# Patient Record
Sex: FEMALE | Race: WHITE | NOT HISPANIC OR LATINO | URBAN - METROPOLITAN AREA
[De-identification: names, ages, dates, MRNs, and addresses within clinical notes are randomized per-mention and may not be internally consistent; named-entity substitution may affect disease eponyms.]

---

## 2017-01-10 ENCOUNTER — IMPORTED ENCOUNTER (OUTPATIENT)
Dept: URBAN - METROPOLITAN AREA CLINIC 50 | Facility: CLINIC | Age: 42
End: 2017-01-10

## 2017-01-11 ENCOUNTER — IMPORTED ENCOUNTER (OUTPATIENT)
Dept: URBAN - METROPOLITAN AREA CLINIC 50 | Facility: CLINIC | Age: 42
End: 2017-01-11

## 2018-05-08 ENCOUNTER — IMPORTED ENCOUNTER (OUTPATIENT)
Dept: URBAN - METROPOLITAN AREA CLINIC 50 | Facility: CLINIC | Age: 43
End: 2018-05-08

## 2019-04-23 ENCOUNTER — IMPORTED ENCOUNTER (OUTPATIENT)
Dept: URBAN - METROPOLITAN AREA CLINIC 50 | Facility: CLINIC | Age: 44
End: 2019-04-23

## 2019-05-01 ENCOUNTER — IMPORTED ENCOUNTER (OUTPATIENT)
Dept: URBAN - METROPOLITAN AREA CLINIC 50 | Facility: CLINIC | Age: 44
End: 2019-05-01

## 2019-05-01 NOTE — PATIENT DISCUSSION
"""Discussed mono VA vs. DVA only w/ OTC readers in the future when she begins to need near correction. Patient given final contact lens prescription.  5/1/2019"""

## 2020-04-28 ENCOUNTER — IMPORTED ENCOUNTER (OUTPATIENT)
Dept: URBAN - METROPOLITAN AREA CLINIC 50 | Facility: CLINIC | Age: 45
End: 2020-04-28

## 2020-06-08 ENCOUNTER — IMPORTED ENCOUNTER (OUTPATIENT)
Dept: URBAN - METROPOLITAN AREA CLINIC 50 | Facility: CLINIC | Age: 45
End: 2020-06-08

## 2020-08-11 ENCOUNTER — IMPORTED ENCOUNTER (OUTPATIENT)
Dept: URBAN - METROPOLITAN AREA CLINIC 50 | Facility: CLINIC | Age: 45
End: 2020-08-11

## 2021-04-18 ASSESSMENT — VISUAL ACUITY
OD_CC: 20/20
OS_CC: 20/20
OD_CC: 20/20
OD_CC: 20/20-1
OD_CC: 20/20
OD_CC: J1+/-1
OS_CC: J1+/-1
OS_CC: 20/20
OS_CC: J1
OD_CC: 20/25
OD_CC: J1
OS_CC: J1+@ 16 IN
OD_CC: J1+@ 16 IN
OD_CC: J1+(-1)
OS_CC: J1+(-1)

## 2021-04-18 ASSESSMENT — TONOMETRY
OD_IOP_MMHG: 9
OD_IOP_MMHG: 10
OS_IOP_MMHG: 8
OS_IOP_MMHG: 15
OD_IOP_MMHG: 8
OD_IOP_MMHG: 15
OS_IOP_MMHG: 10
OS_IOP_MMHG: 10

## 2021-04-27 ENCOUNTER — COMPREHENSIVE EXAM (OUTPATIENT)
Dept: URBAN - METROPOLITAN AREA CLINIC 50 | Facility: CLINIC | Age: 46
End: 2021-04-27

## 2021-04-27 DIAGNOSIS — H52.13: ICD-10-CM

## 2021-04-27 DIAGNOSIS — H40.013: ICD-10-CM

## 2021-04-27 DIAGNOSIS — H43.813: ICD-10-CM

## 2021-04-27 DIAGNOSIS — H25.13: ICD-10-CM

## 2021-04-27 PROCEDURE — 92015 DETERMINE REFRACTIVE STATE: CPT

## 2021-04-27 PROCEDURE — 92014 COMPRE OPH EXAM EST PT 1/>: CPT

## 2021-04-27 PROCEDURE — 76514 ECHO EXAM OF EYE THICKNESS: CPT

## 2021-04-27 ASSESSMENT — KERATOMETRY
OD_AXISANGLE2_DEGREES: 93
OS_AXISANGLE2_DEGREES: 78
OS_K2POWER_DIOPTERS: 45.50
OS_AXISANGLE_DEGREES: 168
OD_AXISANGLE_DEGREES: 003
OS_K1POWER_DIOPTERS: 43.50
OD_K1POWER_DIOPTERS: 44.00
OD_K2POWER_DIOPTERS: 46.50

## 2021-04-27 ASSESSMENT — VISUAL ACUITY
OS_CC: 20/20
OD_CC: J1+
OD_CC: 20/20-2
OU_CC: J1+
OS_CC: J1+
OU_CC: 20/20

## 2021-04-27 ASSESSMENT — TONOMETRY
OD_IOP_MMHG: 9
OS_IOP_MMHG: 9
OD_IOP_MMHG: 8
OS_IOP_MMHG: 10

## 2021-06-07 ENCOUNTER — CONTACT LENS FITTING (OUTPATIENT)
Dept: URBAN - METROPOLITAN AREA CLINIC 50 | Facility: CLINIC | Age: 46
End: 2021-06-07

## 2021-06-07 DIAGNOSIS — H40.013: ICD-10-CM

## 2021-06-07 PROCEDURE — CLF EW NO CL FIT, EW, NO ADJUSTMENT

## 2021-06-07 ASSESSMENT — KERATOMETRY
OD_AXISANGLE2_DEGREES: 93
OD_K1POWER_DIOPTERS: 44.00
OD_AXISANGLE_DEGREES: 003
OD_K2POWER_DIOPTERS: 46.50
OS_K1POWER_DIOPTERS: 43.50
OS_AXISANGLE_DEGREES: 168
OS_K2POWER_DIOPTERS: 45.50
OS_AXISANGLE2_DEGREES: 78

## 2021-06-07 ASSESSMENT — VISUAL ACUITY
OS_CC: 20/15-2
OD_CC: 20/20+2
OU_CC: 20/15-2
OU_CC: J1

## 2022-04-26 ENCOUNTER — COMPREHENSIVE EXAM (OUTPATIENT)
Dept: URBAN - METROPOLITAN AREA CLINIC 50 | Facility: CLINIC | Age: 47
End: 2022-04-26

## 2022-04-26 DIAGNOSIS — H25.13: ICD-10-CM

## 2022-04-26 DIAGNOSIS — H52.203: ICD-10-CM

## 2022-04-26 DIAGNOSIS — H40.013: ICD-10-CM

## 2022-04-26 DIAGNOSIS — H43.813: ICD-10-CM

## 2022-04-26 PROCEDURE — 92015 DETERMINE REFRACTIVE STATE: CPT

## 2022-04-26 PROCEDURE — 92014 COMPRE OPH EXAM EST PT 1/>: CPT

## 2022-04-26 PROCEDURE — 92133 CPTRZD OPH DX IMG PST SGM ON: CPT

## 2022-04-26 ASSESSMENT — KERATOMETRY
OS_K2POWER_DIOPTERS: 45.50
OD_AXISANGLE_DEGREES: 003
OD_K1POWER_DIOPTERS: 44.00
OD_K2POWER_DIOPTERS: 46.50
OS_K1POWER_DIOPTERS: 43.50
OD_AXISANGLE2_DEGREES: 93
OS_AXISANGLE_DEGREES: 168
OS_AXISANGLE2_DEGREES: 78

## 2022-04-26 ASSESSMENT — VISUAL ACUITY
OS_CC: 20/25
OU_CC: 20/20
OD_CC: 20/20-2
OU_CC: J1

## 2022-04-26 ASSESSMENT — TONOMETRY
OS_IOP_MMHG: 10
OD_IOP_MMHG: 10

## 2022-05-16 ENCOUNTER — CONTACT LENSES/GLASSES VISIT (OUTPATIENT)
Dept: URBAN - METROPOLITAN AREA CLINIC 50 | Facility: CLINIC | Age: 47
End: 2022-05-16

## 2022-05-16 DIAGNOSIS — H52.4: ICD-10-CM

## 2022-05-16 PROCEDURE — 92310-1E ESTABLISHED CL PATIENT SPHERICAL SINGLE VISION SOFT LENS EVALUATION

## 2022-05-16 ASSESSMENT — KERATOMETRY
OD_K1POWER_DIOPTERS: 44.00
OD_AXISANGLE2_DEGREES: 93
OD_AXISANGLE_DEGREES: 003
OS_K2POWER_DIOPTERS: 45.50
OS_AXISANGLE_DEGREES: 168
OS_AXISANGLE2_DEGREES: 78
OS_K1POWER_DIOPTERS: 43.50
OD_K2POWER_DIOPTERS: 46.50

## 2022-05-16 ASSESSMENT — VISUAL ACUITY
OD_CC: 20/20
OS_CC: 20/20-1
OU_CC: 20/20-1

## 2022-05-16 NOTE — PATIENT DISCUSSION
Advised patient no swimming, sleeping, or showering in lenses. Do not over wear lenses. If pain or irritation occur while wearing lenses, discontinue wearing lenses and call our office.

## 2023-05-11 ENCOUNTER — COMPREHENSIVE EXAM (OUTPATIENT)
Dept: URBAN - METROPOLITAN AREA CLINIC 50 | Facility: CLINIC | Age: 48
End: 2023-05-11

## 2023-05-11 DIAGNOSIS — H52.4: ICD-10-CM

## 2023-05-11 DIAGNOSIS — Z97.3: ICD-10-CM

## 2023-05-11 DIAGNOSIS — H40.013: ICD-10-CM

## 2023-05-11 DIAGNOSIS — H25.13: ICD-10-CM

## 2023-05-11 PROCEDURE — 92014 COMPRE OPH EXAM EST PT 1/>: CPT

## 2023-05-11 PROCEDURE — 92015 DETERMINE REFRACTIVE STATE: CPT

## 2023-05-11 PROCEDURE — 92250 FUNDUS PHOTOGRAPHY W/I&R: CPT

## 2023-05-11 PROCEDURE — 92310-2E ESTABLISHED CL PATIENT TORIC (ASTIGMATISM) SINGLE VISION SOFT LENS EVALUATION

## 2023-05-11 ASSESSMENT — PACHYMETRY
OD_CT_UM: 530
OS_CT_UM: 543

## 2023-05-11 ASSESSMENT — KERATOMETRY
OS_K1POWER_DIOPTERS: 43.00
OD_K2POWER_DIOPTERS: 46.00
OD_AXISANGLE2_DEGREES: 94
OD_AXISANGLE_DEGREES: 4
OS_AXISANGLE_DEGREES: 172
OS_K2POWER_DIOPTERS: 45.25
OD_K1POWER_DIOPTERS: 43.50
OS_AXISANGLE2_DEGREES: 82

## 2023-05-11 ASSESSMENT — TONOMETRY
OS_IOP_MMHG: 07
OS_IOP_MMHG: 07
OD_IOP_MMHG: 08
OD_IOP_MMHG: 09

## 2023-05-11 ASSESSMENT — VISUAL ACUITY
OU_CC: J2@16"
OD_GLARE: 20/20
OS_GLARE: 20/25
OU_CC: 20/20
OS_CC: 20/25
OD_GLARE: 20/20
OD_CC: 20/25
OS_GLARE: 20/40

## 2023-06-21 ENCOUNTER — DIAGNOSTICS ONLY (OUTPATIENT)
Dept: URBAN - METROPOLITAN AREA CLINIC 50 | Facility: CLINIC | Age: 48
End: 2023-06-21

## 2023-06-21 DIAGNOSIS — H40.013: ICD-10-CM

## 2023-06-21 PROCEDURE — 92133 CPTRZD OPH DX IMG PST SGM ON: CPT

## 2023-06-21 PROCEDURE — 92083 EXTENDED VISUAL FIELD XM: CPT

## 2023-06-21 ASSESSMENT — KERATOMETRY
OD_K1POWER_DIOPTERS: 43.50
OS_AXISANGLE_DEGREES: 172
OD_AXISANGLE_DEGREES: 4
OS_K1POWER_DIOPTERS: 43.00
OD_K2POWER_DIOPTERS: 46.00
OD_AXISANGLE2_DEGREES: 94
OS_K2POWER_DIOPTERS: 45.25
OS_AXISANGLE2_DEGREES: 82

## 2024-06-13 ENCOUNTER — COMPREHENSIVE EXAM (OUTPATIENT)
Dept: URBAN - METROPOLITAN AREA CLINIC 52 | Facility: CLINIC | Age: 49
End: 2024-06-13

## 2024-06-13 DIAGNOSIS — H52.203: ICD-10-CM

## 2024-06-13 DIAGNOSIS — H25.13: ICD-10-CM

## 2024-06-13 DIAGNOSIS — H40.013: ICD-10-CM

## 2024-06-13 DIAGNOSIS — D31.32: ICD-10-CM

## 2024-06-13 PROCEDURE — 99214 OFFICE O/P EST MOD 30 MIN: CPT

## 2024-06-13 PROCEDURE — 92015 DETERMINE REFRACTIVE STATE: CPT

## 2024-06-13 ASSESSMENT — VISUAL ACUITY
OS_CC: 20/20
OS_GLARE: 20/25-1
OD_PH: 20/20
OD_GLARE: 20/25
OD_CC: 20/30
OU_CC: 20/20
OD_GLARE: 20/20
OS_GLARE: 20/25-1

## 2024-06-13 ASSESSMENT — TONOMETRY
OS_IOP_MMHG: 14
OD_IOP_MMHG: 13

## 2024-06-17 ENCOUNTER — CONTACT LENSES/GLASSES VISIT (OUTPATIENT)
Dept: URBAN - METROPOLITAN AREA CLINIC 24 | Facility: CLINIC | Age: 49
End: 2024-06-17

## 2024-06-17 DIAGNOSIS — H52.4: ICD-10-CM

## 2024-06-17 DIAGNOSIS — Z97.3: ICD-10-CM

## 2024-06-17 PROCEDURE — 92310-2E ESTABLISHED CL PATIENT TORIC (ASTIGMATISM) SINGLE VISION SOFT LENS EVALUATION

## 2024-06-17 ASSESSMENT — VISUAL ACUITY
OD_CC: 20/25+1
OS_CC: 20/25+1
OU_CC: 20/15-3

## 2024-07-02 ENCOUNTER — EMERGENCY VISIT (OUTPATIENT)
Dept: URBAN - METROPOLITAN AREA CLINIC 50 | Facility: LOCATION | Age: 49
End: 2024-07-02

## 2024-07-02 DIAGNOSIS — Z97.3: ICD-10-CM

## 2024-07-02 DIAGNOSIS — H16.223: ICD-10-CM

## 2024-07-02 PROCEDURE — 6876150 PUNCTUM PLUG, BILATERAL: Mod: 25,50

## 2024-07-02 PROCEDURE — 99214 OFFICE O/P EST MOD 30 MIN: CPT

## 2024-07-02 ASSESSMENT — VISUAL ACUITY
OD_CC: 20/25
OS_CC: 20/25

## 2024-07-02 ASSESSMENT — TONOMETRY
OS_IOP_MMHG: 10
OD_IOP_MMHG: 10

## 2024-09-03 ENCOUNTER — FOLLOW UP (OUTPATIENT)
Dept: URBAN - METROPOLITAN AREA CLINIC 50 | Facility: LOCATION | Age: 49
End: 2024-09-03

## 2024-09-03 DIAGNOSIS — Z97.3: ICD-10-CM

## 2024-09-03 DIAGNOSIS — H16.223: ICD-10-CM

## 2024-09-03 PROCEDURE — 99213 OFFICE O/P EST LOW 20 MIN: CPT

## 2025-06-18 ENCOUNTER — COMPREHENSIVE EXAM (OUTPATIENT)
Age: 50
End: 2025-06-18

## 2025-06-18 DIAGNOSIS — H52.4: ICD-10-CM

## 2025-06-18 DIAGNOSIS — Z01.01: ICD-10-CM

## 2025-06-18 PROCEDURE — 92310-3 LEVEL 3 SOFT LENS UPDATE

## 2025-06-18 PROCEDURE — 92015 DETERMINE REFRACTIVE STATE: CPT

## 2025-06-18 PROCEDURE — 92014 COMPRE OPH EXAM EST PT 1/>: CPT
